# Patient Record
Sex: MALE | Race: BLACK OR AFRICAN AMERICAN | URBAN - METROPOLITAN AREA
[De-identification: names, ages, dates, MRNs, and addresses within clinical notes are randomized per-mention and may not be internally consistent; named-entity substitution may affect disease eponyms.]

---

## 2022-07-19 ENCOUNTER — OFFICE VISIT (OUTPATIENT)
Dept: INTERNAL MEDICINE CLINIC | Age: 32
End: 2022-07-19
Payer: COMMERCIAL

## 2022-07-19 VITALS
HEART RATE: 75 BPM | TEMPERATURE: 98.2 F | DIASTOLIC BLOOD PRESSURE: 75 MMHG | HEIGHT: 66 IN | BODY MASS INDEX: 22.18 KG/M2 | SYSTOLIC BLOOD PRESSURE: 120 MMHG | OXYGEN SATURATION: 99 % | WEIGHT: 138 LBS

## 2022-07-19 DIAGNOSIS — F90.9 ATTENTION DEFICIT HYPERACTIVITY DISORDER (ADHD), UNSPECIFIED ADHD TYPE: Primary | ICD-10-CM

## 2022-07-19 DIAGNOSIS — Z76.89 ENCOUNTER TO ESTABLISH CARE: ICD-10-CM

## 2022-07-19 PROCEDURE — 1036F TOBACCO NON-USER: CPT | Performed by: NURSE PRACTITIONER

## 2022-07-19 PROCEDURE — 99203 OFFICE O/P NEW LOW 30 MIN: CPT | Performed by: NURSE PRACTITIONER

## 2022-07-19 PROCEDURE — G8420 CALC BMI NORM PARAMETERS: HCPCS | Performed by: NURSE PRACTITIONER

## 2022-07-19 PROCEDURE — G8427 DOCREV CUR MEDS BY ELIG CLIN: HCPCS | Performed by: NURSE PRACTITIONER

## 2022-07-19 SDOH — ECONOMIC STABILITY: FOOD INSECURITY: WITHIN THE PAST 12 MONTHS, THE FOOD YOU BOUGHT JUST DIDN'T LAST AND YOU DIDN'T HAVE MONEY TO GET MORE.: NEVER TRUE

## 2022-07-19 SDOH — ECONOMIC STABILITY: FOOD INSECURITY: WITHIN THE PAST 12 MONTHS, YOU WORRIED THAT YOUR FOOD WOULD RUN OUT BEFORE YOU GOT MONEY TO BUY MORE.: NEVER TRUE

## 2022-07-19 ASSESSMENT — PATIENT HEALTH QUESTIONNAIRE - PHQ9
1. LITTLE INTEREST OR PLEASURE IN DOING THINGS: 0
SUM OF ALL RESPONSES TO PHQ QUESTIONS 1-9: 0
SUM OF ALL RESPONSES TO PHQ QUESTIONS 1-9: 0
SUM OF ALL RESPONSES TO PHQ9 QUESTIONS 1 & 2: 0
SUM OF ALL RESPONSES TO PHQ QUESTIONS 1-9: 0
SUM OF ALL RESPONSES TO PHQ QUESTIONS 1-9: 0
2. FEELING DOWN, DEPRESSED OR HOPELESS: 0

## 2022-07-19 ASSESSMENT — SOCIAL DETERMINANTS OF HEALTH (SDOH): HOW HARD IS IT FOR YOU TO PAY FOR THE VERY BASICS LIKE FOOD, HOUSING, MEDICAL CARE, AND HEATING?: NOT VERY HARD

## 2022-07-19 NOTE — PROGRESS NOTES
No gallop. Pulmonary:      Effort: Pulmonary effort is normal. No respiratory distress. Breath sounds: Normal breath sounds. No stridor. No wheezing, rhonchi or rales. Abdominal:      General: Bowel sounds are normal. There is no distension. Palpations: Abdomen is soft. There is no mass. Tenderness: There is no abdominal tenderness. There is no guarding. Hernia: No hernia is present. Musculoskeletal:         General: Normal range of motion. Cervical back: Normal range of motion. No rigidity. Skin:     General: Skin is warm and dry. Neurological:      Mental Status: He is alert and oriented to person, place, and time. Psychiatric:         Mood and Affect: Mood normal.         Behavior: Behavior normal.          Assessment/Plan:  1. Attention deficit hyperactivity disorder (ADHD), unspecified ADHD type  - External Referral to Psychiatry  - CBC with Auto Differential; Future  - Comprehensive Metabolic Panel; Future    2. Encounter to establish care  - CBC with Auto Differential; Future  - Comprehensive Metabolic Panel; Future      Return if symptoms worsen or fail to improve.

## 2022-07-21 ASSESSMENT — ENCOUNTER SYMPTOMS
RESPIRATORY NEGATIVE: 1
GASTROINTESTINAL NEGATIVE: 1

## 2022-11-16 ENCOUNTER — TELEPHONE (OUTPATIENT)
Dept: INTERNAL MEDICINE CLINIC | Age: 32
End: 2022-11-16

## 2022-11-16 NOTE — TELEPHONE ENCOUNTER
----- Message from Marc Cordova sent at 11/16/2022  3:43 PM EST -----  Subject: Referral Request    Reason for referral request? Psychologist  Provider patient wants to be referred to(if known):     Provider Phone Number(if known): Additional Information for Provider? Sintia Gamino would like to f/u with   Psychologist in this office that was mentioned to him by provider.  Please   f/u with him.   ---------------------------------------------------------------------------  --------------  Mavis Frank INFO    [phone]; OK to leave message on voicemail  ---------------------------------------------------------------------------  --------------

## 2022-12-14 ENCOUNTER — OFFICE VISIT (OUTPATIENT)
Dept: PSYCHIATRY | Age: 32
End: 2022-12-14

## 2022-12-14 VITALS
SYSTOLIC BLOOD PRESSURE: 128 MMHG | BODY MASS INDEX: 23.5 KG/M2 | OXYGEN SATURATION: 98 % | WEIGHT: 145.6 LBS | HEART RATE: 82 BPM | DIASTOLIC BLOOD PRESSURE: 70 MMHG

## 2022-12-14 DIAGNOSIS — F41.1 GAD (GENERALIZED ANXIETY DISORDER): ICD-10-CM

## 2022-12-14 DIAGNOSIS — F33.0 MILD EPISODE OF RECURRENT MAJOR DEPRESSIVE DISORDER (HCC): ICD-10-CM

## 2022-12-14 DIAGNOSIS — F90.2 ADHD (ATTENTION DEFICIT HYPERACTIVITY DISORDER), COMBINED TYPE: Primary | ICD-10-CM

## 2022-12-14 RX ORDER — BUPROPION HYDROCHLORIDE 150 MG/1
150 TABLET ORAL EVERY MORNING
Qty: 30 TABLET | Refills: 3 | Status: SHIPPED | OUTPATIENT
Start: 2022-12-14

## 2022-12-14 ASSESSMENT — PATIENT HEALTH QUESTIONNAIRE - PHQ9
1. LITTLE INTEREST OR PLEASURE IN DOING THINGS: 1
SUM OF ALL RESPONSES TO PHQ QUESTIONS 1-9: 11
2. FEELING DOWN, DEPRESSED OR HOPELESS: 1
3. TROUBLE FALLING OR STAYING ASLEEP: 2
7. TROUBLE CONCENTRATING ON THINGS, SUCH AS READING THE NEWSPAPER OR WATCHING TELEVISION: 2
6. FEELING BAD ABOUT YOURSELF - OR THAT YOU ARE A FAILURE OR HAVE LET YOURSELF OR YOUR FAMILY DOWN: 2
8. MOVING OR SPEAKING SO SLOWLY THAT OTHER PEOPLE COULD HAVE NOTICED. OR THE OPPOSITE, BEING SO FIGETY OR RESTLESS THAT YOU HAVE BEEN MOVING AROUND A LOT MORE THAN USUAL: 1
SUM OF ALL RESPONSES TO PHQ QUESTIONS 1-9: 11
4. FEELING TIRED OR HAVING LITTLE ENERGY: 1
SUM OF ALL RESPONSES TO PHQ QUESTIONS 1-9: 11
5. POOR APPETITE OR OVEREATING: 1
SUM OF ALL RESPONSES TO PHQ QUESTIONS 1-9: 11
SUM OF ALL RESPONSES TO PHQ9 QUESTIONS 1 & 2: 2

## 2022-12-14 ASSESSMENT — ANXIETY QUESTIONNAIRES
6. BECOMING EASILY ANNOYED OR IRRITABLE: 1
GAD7 TOTAL SCORE: 15
5. BEING SO RESTLESS THAT IT IS HARD TO SIT STILL: 3
2. NOT BEING ABLE TO STOP OR CONTROL WORRYING: 2
3. WORRYING TOO MUCH ABOUT DIFFERENT THINGS: 3
4. TROUBLE RELAXING: 2
7. FEELING AFRAID AS IF SOMETHING AWFUL MIGHT HAPPEN: 1
1. FEELING NERVOUS, ANXIOUS, OR ON EDGE: 3

## 2022-12-14 NOTE — PATIENT INSTRUCTIONS
Here are some of Psychiatric Emergency resources for you:     National suicide hotlines: 97 631649, 1-674-086-TALK (9-219.887.8099) and 9-981-QJCTEES (4-807.114.3511). 2.  Call 911 or go to any nearest emergency room   3. Access the [facility] Emergency Psychiatry Services:     - Go to the [facility] Psychiatric Emergency Services (PES) at [address], [address]   - Call the [address] at [phone].    - Call the [facility] Mobile Crisis Team at [phone]    Anti-depressant drugs: This class of drugs can cause sedation, blurred vision, dry-mouth, constipation, postural hypotension, urinary retention, tachycardia, muscle tremors, agitation, headache, skin rash, photo sensitivity, excessive weight gain, glaucoma, heart disease, lowering seizure threshold, increase risk of suicidal thinking or ideations, excessive sweating, sextual dysfunction, insomnia, anxiety, bruxism, GI bleeding, pregnancy complications and birth defects, possible death, etc.    HERE ARE SOME OF THE [facility]:    [place]. Address: [address], [place] [address], Phone: [phone] of [facility] and Psychological Services: Address: [address] [facility] [place], [place], Phone: [phone]    [facility]/ Formerly called [facility]. Address: [address] [facility] [place], [place], Phone: [phone]    [address] [place] [address]    Address: [address] [facility] [address] [facility]    Phone: [phone]     [facility]    Address: [place] [address] [facility] [address], [address]   Phone: [phone]     [facility].   Address: [facility] [address], [address]   Phone: [phone]    [facility].    Address[facility] [address] [facility]   Phone: [phone] [facility]. Address: [address] [facility], [place], [address] [place] [address]    Phone: [phone]    [facility]. Address [address] [facility] [address], Phone. [address] Psychiatry services: phone [phone]. Patients: please, call your insurance company to get the full lists of behavioral health counselling providers in your area.   Anti-anxiety drugs: Sedation, morning hangover, ataxia, nausea, respiratory depression, decrease cognitive function, light-headiness, withdrawal effects, anterograde amnesia, possible death, etc.

## 2022-12-14 NOTE — PROGRESS NOTES
Psychiatry Initial Consultation    Patient Name: Bubba Torres  MRN: [id number]  Date of Service: 12/14/2022     Referring provider for consult: Aziza Channel, APRN    Reason for Consult:  anxiety, depression, ADHD  Dx:  1. ADHD (attention deficit hyperactivity disorder), combined type  2. BRETT (generalized anxiety disorder)  3. Mild episode of recurrent major depressive disorder (HCC)    Assessment and plan    Psychiatric   - Start Wellbutrin  mg/daily. - Continue Adderall 5 mg daily. - Continue Buspar 5 mg twice a day. - Takes Ativan 0.5 mg as needed for anxiety. Currently takes once a week   - Medication R/B/SE discussed and patient gave verbal consent for tx. Practice complementary health approaches such as: self-management strategies, relaxation techniques, yoga, and physical exercise as tolerated. Psychotherapy   - resources given. He is actively looking for a therapist.   3. Substance Abuse   - he currently does vaping frequently throughout the day. He wants to cut back. 4. Medical   - follow with PCP  5. RTC in 3 months or earlier if your symptoms fail to improve or go to nearest ER if having active SI/HI. Evaluated medications and assessed for side effects and effectiveness. Assessed patient's educational needs including reviewing plan of care, medications,diagnosis, treatment options, and prognosis. I reviewed the plan of care with the patient and the patient consented to the treatment interventions. Patient acknowledged, verbalized understanding, and agreed with plan of care. HPI:   This is a 34 y.o., male  here today for psychiatric evaluation onsite at [address] [place] [address] MD . The patient is here at the referral of his PCP for [place] [address] evaluation and counseling. He had adhd and anxiety for many years. He had counseling during college and after the college for a while. He was on Ativan and Adderall. He recently started seeing psychiatrist from [place].  He could not find psychiatrist locally and the waiting list is longer. But he wants someone locally can see and wants psychotherapy as well. He recently started on Ativan PRN for anxiety, Buspar and Adderall for ADHD. He takes Buspar daily and Ativan few dose weekly. He wants not to  depend on mediations and wants to manage anxiety and Mental health issues with counseling. He still sees his psychiatrist via virtual visit. + irritability and gets overwhelmed at times. Drifting off tasks and irritable. 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 Difficulty of sleep at times. Discussed with the patient tx options and long-term effects of Mariano and Adderall tx. I will add Wellbutrin Xl to help decrease the craving for smoke and helps with depression and anxiety. He denies SI/HI/AVH    Psychiatric Focused ROS:  Depressive sxs:  irritable, insomnia, poor focus. Manic or hypomanic sxs : denies   Anxiety sxs:      Constant worry:Yes     Irritability/ edgy:Yes     Disrupted sleep:Yes     Somatic/ tension: yes     Restless/ fatigue:Yes  Psychotic sxs: Denies AVH, paranoia, delusions  ADHD: poor focus, concentration, drifting off tasks  PTSD: denies      Context:  office visit  Location: mind-anxiety, depression, focus issue. Duration/Timing:  chronic   Severity/ character: moderate  Associated symptoms:  As above  Modifying factors: progression of illness, life stress. Disposition: The patient does not require inpatient psychiatric admission. Risk factors:  d/o, male/female. He/She is not currently an imminent safety risk as he/she denies SI/HI, is future oriented and expresses a desire to get better and healthier. Protective factors: Family    Past Psychiatric History:    Previous Diagnoses: ADHD ( college), BRETT  Previous Hospitalizations: Denies/ had couple ED visit.    Outpatient Treatment: counseling in the past   Past Medication Trials: Klonopin  Suicidality: Denies   History of Violence: denies   Family Hx psychiatric disorders: denies   Family hx SA/ completed suicides: denies     Past Medical History:  Past Medical History:   Diagnosis Date    ADHD (attention deficit hyperactivity disorder)        Home Medications:  Prior to Admission medications    Medication Sig Start Date End Date Taking? Authorizing Provider   buPROPion (WELLBUTRIN XL) 150 MG extended release tablet Take 1 tablet by mouth every morning 12/14/22  Yes Saman Certain, APRN - CNP      Allergies  No Known Allergies   Chemical Dependency History:   Social History     Tobacco Use    Smoking status: Never    Smokeless tobacco: Never   Substance Use Topics    Alcohol use: Yes        Illicit: smokes occasionally MJ for anxiety. ETOH: social drinker     Nicotine: vaping ( throught out the day)     Caffeine: mild drinker caffeine intake. Family Hx:    Family History   Problem Relation Age of Onset    Cancer Mother     Diabetes Father     No Known Problems Sister       Social Hx:     Developmental: Born and raised in [place], [place]. He has two sisters     Marital Status: has a girlfriend.  before    Children: none     Housing: Lives alone     Educational: [employer]- political science     Vocational: Digital marketing     Abuse/Trauma: denies     Legal: denies     Gun: No access to gun or own a gun. Current Medications Ordered:  No current outpatient medications on file prior to visit. No current facility-administered medications on file prior to visit. ROS: Negative for HA, blurry vision, dyspnea, CP, SOB, nausea or vomiting. PE:    /70 (Site: Right Upper Arm, Position: Sitting, Cuff Size: Small Adult)   Pulse 82   Wt 145 lb 9.6 oz (66 kg)   SpO2 98%   BMI 23.50 kg/m²     BRETT 7 SCORE 12/14/2022   BRETT-7 Total Score 15     Interpretation of BRETT-7 score: 5-9 = mild anxiety, 10-14 = moderate anxiety,   15+ = severe anxiety. Recommend referral to behavioral health for scores 10 or greater.   PHQ-9 Total Score: 11 (12/14/2022 11:36 AM)  Interpretation of PHQ-9 score:  1-4 = minimal depression, 5-9 = mild depression,   10-14 = moderate depression; 15-19 = moderately severe depression, 20-27 = severe depression  Motor / Gait: no abnormalities noted, normal gait and station  AIMS: 0  LAB: up to date     Mental Status Examination  Appearance    alert, cooperative  Motor: Normal strength and tone, No abnormal movements, tics or mannerisms. Speech    spontaneous  Mood/Affect    Anxious / anxiety  Thought Process    linear and goal directed  Thought Content    intact , no suicidal ideation  Associations    logical connections  Attention/Concentration    intact  Memory    recent and remote memory intact  Insight/Judgement    Good / Intact    Safety plan  Discussed and educated patient to call [phone], or 911, or go to nearest emergency room if patient experiences SI/HI immediately. In addition, Patient educated to use the National Suicide Hotlines: 9-696-162-TALK (9-163.357.5094) and 5-541-UTCYSBK (3-920.949.6152) and Local psychiatric Emergency Services given to patient during the office visit. Total time spent on this encounter:  65 minutes     Thank you for consult. Please do not hesitate to contact provider if there are additional questions regarding patient.     Jan Torres DNP, PMHNP-BC, CNP  12/14/2022

## 2022-12-28 ENCOUNTER — TELEPHONE (OUTPATIENT)
Dept: INTERNAL MEDICINE CLINIC | Age: 32
End: 2022-12-28

## 2022-12-28 NOTE — TELEPHONE ENCOUNTER
Patient tested positive for covid and states he has a  Light cough low grade fever under 100, congestion and fatigue. 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

## 2025-07-22 ENCOUNTER — HOSPITAL ENCOUNTER (EMERGENCY)
Age: 35
Discharge: HOME | End: 2025-07-22
Attending: EMERGENCY MEDICINE
Payer: COMMERCIAL

## 2025-07-22 ENCOUNTER — APPOINTMENT (OUTPATIENT)
Dept: GENERAL RADIOLOGY | Age: 35
End: 2025-07-22
Payer: COMMERCIAL

## 2025-07-22 VITALS
OXYGEN SATURATION: 100 % | RESPIRATION RATE: 18 BRPM | HEIGHT: 66 IN | WEIGHT: 148.37 LBS | BODY MASS INDEX: 23.84 KG/M2 | TEMPERATURE: 99.2 F | HEART RATE: 72 BPM | DIASTOLIC BLOOD PRESSURE: 98 MMHG | SYSTOLIC BLOOD PRESSURE: 159 MMHG

## 2025-07-22 DIAGNOSIS — R03.0 ELEVATED BLOOD PRESSURE READING: ICD-10-CM

## 2025-07-22 DIAGNOSIS — R00.2 PALPITATIONS: Primary | ICD-10-CM

## 2025-07-22 DIAGNOSIS — F41.9 ANXIETY: ICD-10-CM

## 2025-07-22 LAB
ALBUMIN SERPL-MCNC: 4.5 G/DL (ref 3.4–5)
ALBUMIN/GLOB SERPL: 1.7 {RATIO} (ref 1.1–2.2)
ALP SERPL-CCNC: 60 U/L (ref 40–129)
ALT SERPL-CCNC: 40 U/L (ref 10–40)
ANION GAP SERPL CALCULATED.3IONS-SCNC: 11 MMOL/L (ref 3–16)
AST SERPL-CCNC: 108 U/L (ref 15–37)
BASOPHILS # BLD: 0 K/UL (ref 0–0.2)
BASOPHILS NFR BLD: 0.5 %
BILIRUB SERPL-MCNC: 0.9 MG/DL (ref 0–1)
BUN SERPL-MCNC: 13 MG/DL (ref 7–20)
CALCIUM SERPL-MCNC: 9.8 MG/DL (ref 8.3–10.6)
CHLORIDE SERPL-SCNC: 99 MMOL/L (ref 99–110)
CO2 SERPL-SCNC: 26 MMOL/L (ref 21–32)
CREAT SERPL-MCNC: 1.3 MG/DL (ref 0.9–1.3)
D-DIMER QUANTITATIVE: <0.27 UG/ML FEU (ref 0–0.6)
DEPRECATED RDW RBC AUTO: 14.7 % (ref 12.4–15.4)
EOSINOPHIL # BLD: 0 K/UL (ref 0–0.6)
EOSINOPHIL NFR BLD: 0.7 %
GFR SERPLBLD CREATININE-BSD FMLA CKD-EPI: 73 ML/MIN/{1.73_M2}
GLUCOSE SERPL-MCNC: 89 MG/DL (ref 70–99)
HCT VFR BLD AUTO: 47.5 % (ref 40.5–52.5)
HGB BLD-MCNC: 15.2 G/DL (ref 13.5–17.5)
LYMPHOCYTES # BLD: 1 K/UL (ref 1–5.1)
LYMPHOCYTES NFR BLD: 19.5 %
MCH RBC QN AUTO: 26.6 PG (ref 26–34)
MCHC RBC AUTO-ENTMCNC: 32 G/DL (ref 31–36)
MCV RBC AUTO: 82.9 FL (ref 80–100)
MONOCYTES # BLD: 0.5 K/UL (ref 0–1.3)
MONOCYTES NFR BLD: 10.6 %
NEUTROPHILS # BLD: 3.4 K/UL (ref 1.7–7.7)
NEUTROPHILS NFR BLD: 68.7 %
PLATELET # BLD AUTO: 276 K/UL (ref 135–450)
PMV BLD AUTO: 7.4 FL (ref 5–10.5)
POTASSIUM SERPL-SCNC: 4 MMOL/L (ref 3.5–5.1)
PROT SERPL-MCNC: 7.2 G/DL (ref 6.4–8.2)
RBC # BLD AUTO: 5.73 M/UL (ref 4.2–5.9)
SODIUM SERPL-SCNC: 136 MMOL/L (ref 136–145)
TROPONIN, HIGH SENSITIVITY: <6 NG/L (ref 0–22)
TROPONIN, HIGH SENSITIVITY: <6 NG/L (ref 0–22)
WBC # BLD AUTO: 4.9 K/UL (ref 4–11)

## 2025-07-22 PROCEDURE — 85379 FIBRIN DEGRADATION QUANT: CPT

## 2025-07-22 PROCEDURE — 71046 X-RAY EXAM CHEST 2 VIEWS: CPT

## 2025-07-22 PROCEDURE — 93005 ELECTROCARDIOGRAM TRACING: CPT | Performed by: EMERGENCY MEDICINE

## 2025-07-22 PROCEDURE — 80053 COMPREHEN METABOLIC PANEL: CPT

## 2025-07-22 PROCEDURE — 85025 COMPLETE CBC W/AUTO DIFF WBC: CPT

## 2025-07-22 PROCEDURE — 99285 EMERGENCY DEPT VISIT HI MDM: CPT

## 2025-07-22 PROCEDURE — 84484 ASSAY OF TROPONIN QUANT: CPT

## 2025-07-22 RX ORDER — NALTREXONE HYDROCHLORIDE 50 MG/1
50 TABLET, FILM COATED ORAL DAILY
COMMUNITY

## 2025-07-22 RX ORDER — LORAZEPAM 0.5 MG/1
0.5 TABLET ORAL PRN
COMMUNITY
Start: 2025-06-30 | End: 2025-09-29

## 2025-07-22 RX ORDER — DEXTROAMPHETAMINE SACCHARATE, AMPHETAMINE ASPARTATE MONOHYDRATE, DEXTROAMPHETAMINE SULFATE AND AMPHETAMINE SULFATE 2.5; 2.5; 2.5; 2.5 MG/1; MG/1; MG/1; MG/1
10 CAPSULE, EXTENDED RELEASE ORAL EVERY MORNING
COMMUNITY
Start: 2024-10-21

## 2025-07-22 RX ORDER — FLUOXETINE 10 MG/1
10 CAPSULE ORAL DAILY
COMMUNITY
Start: 2025-05-20

## 2025-07-22 ASSESSMENT — PAIN - FUNCTIONAL ASSESSMENT
PAIN_FUNCTIONAL_ASSESSMENT: NONE - DENIES PAIN
PAIN_FUNCTIONAL_ASSESSMENT: NONE - DENIES PAIN

## 2025-07-22 NOTE — ED PROVIDER NOTES
the Primary Clinician of Record.        FINAL IMPRESSION    1. Palpitations    2. Anxiety    3. Elevated blood pressure reading           DISPOSITION/PLAN:       Pt discharged home in stable condition.     PAT[facility]:   [facility]  [address] [facility] [id number] [phone]    If symptoms worsen    Ulisses Flores DO  [address] [facility]  2nd Floor  [facility] [address]  [phone]             DISCHARGE MEDICATIONS:   Discharge Medication List as of 7/22/2025  6:11 PM           DISCONTINUED MEDICATIONS:   Discontinued Medications    No medications on file              (Please note that portions of this note were completed with a voice recognition program.  Efforts were made to edit the dictations but occasionally words are mis-transcribed.)       Joselito Cordova II, DO (electronically signed)             Joselito Cordova II, DO  07/22/25 9119

## 2025-07-22 NOTE — DISCHARGE INSTR - COC
Continuity of Care Form    Patient Name: Robi Smiley   :  1990  MRN:  [id number]    Admit date:  2025  Discharge date:  ***    Code Status Order: No Order   Advance Directives:     Admitting Physician:  No admitting provider for patient encounter.  PCP: Larissa Chavez APRN    Discharging Nurse: ***  Discharging Hospital Unit/Room#:   Discharging Unit Phone Number: ***    Emergency Contact:   Extended Emergency Contact Information  Primary Emergency Contact: Kenny Smiley  Mobile Phone: [phone]  Relation: Parent   needed? No    Past Surgical History:  Past Surgical History:   Procedure Laterality Date    HERNIA REPAIR  2022       Immunization History:   Immunization History   Administered Date(s) Administered    COVID-19, PFIZER PURPLE top, DILUTE for use, (age 12 y+), 30mcg/0.3mL 03/15/2021, 2021, 2022       Active Problems:  There is no problem list on file for this patient.      Isolation/Infection:   Isolation            No Isolation          Patient Infection Status    None to display         Nurse Assessment:  Last Vital Signs: BP (!) 167/103   Pulse 76   Temp 99.2 °F (37.3 °C)   Resp 18   Ht 1.676 m (5' 6\")   Wt 67.3 kg (148 lb 5.9 oz)   SpO2 100%   BMI 23.95 kg/m²     Last documented pain score (0-10 scale):    Last Weight:   Wt Readings from Last 1 Encounters:   25 67.3 kg (148 lb 5.9 oz)     Mental Status:  {IP PT MENTAL STATUS:}    IV Access:  { LATASHA IV ACCESS:648071073}    Nursing Mobility/ADLs:  Walking   {CHP DME ADLs:993169715}  Transfer  {CHP DME ADLs:382345819}  Bathing  {CHP DME ADLs:868510509}  Dressing  {CHP DME ADLs:503309479}  Toileting  {CHP DME ADLs:446551093}  Feeding  {CHP DME ADLs:951235483}  Med Admin  {CHP DME ADLs:389544229}  Med Delivery   { LATASHA MED Delivery:016999291}    Wound Care Documentation and Therapy:        Elimination:  Continence:   Bowel: {YES / NO:}  Bladder: {YES / NO:}  Urinary Catheter:

## 2025-07-23 LAB
EKG ATRIAL RATE: 72 BPM
EKG DIAGNOSIS: NORMAL
EKG P AXIS: 57 DEGREES
EKG P-R INTERVAL: 186 MS
EKG Q-T INTERVAL: 402 MS
EKG QRS DURATION: 110 MS
EKG QTC CALCULATION (BAZETT): 440 MS
EKG R AXIS: 2 DEGREES
EKG T AXIS: 20 DEGREES
EKG VENTRICULAR RATE: 72 BPM

## 2025-07-23 PROCEDURE — 93010 ELECTROCARDIOGRAM REPORT: CPT | Performed by: INTERNAL MEDICINE
